# Patient Record
(demographics unavailable — no encounter records)

---

## 2024-10-08 NOTE — DISCUSSION/SUMMARY
[May participate in all age-appropriate activities] : [unfilled] May participate in all age-appropriate activities. [Needs SBE Prophylaxis] : [unfilled] does not need bacterial endocarditis prophylaxis [FreeTextEntry1] : Follow-up as needed

## 2024-10-08 NOTE — PHYSICAL EXAM
[General Appearance - Alert] : alert [General Appearance - In No Acute Distress] : in no acute distress [General Appearance - Well Nourished] : well nourished [General Appearance - Well Developed] : well developed [General Appearance - Well-Appearing] : well appearing [Appearance Of Head] : the head was normocephalic [Facies] : there were no dysmorphic facial features [Sclera] : the conjunctiva were normal [Outer Ear] : the ears and nose were normal in appearance [Examination Of The Oral Cavity] : mucous membranes were moist and pink [Auscultation Breath Sounds / Voice Sounds] : breath sounds clear to auscultation bilaterally [Normal Chest Appearance] : the chest was normal in appearance [Apical Impulse] : quiet precordium with normal apical impulse [Heart Rate And Rhythm] : normal heart rate and rhythm [Heart Sounds] : normal S1 and S2 [Heart Sounds Gallop] : no gallops [Heart Sounds Pericardial Friction Rub] : no pericardial rub [Edema] : no edema [Arterial Pulses] : normal upper and lower extremity pulses with no pulse delay [Heart Sounds Click] : no clicks [Capillary Refill Test] : normal capillary refill [Systolic] : systolic [II] : a grade 2/6 [LMSB] : LMSB  [Short] : short [Low] : low pitched [Vibratory] : vibratory [Early] : early [[place]] : the murmur was transmitted to the apex [Bowel Sounds] : normal bowel sounds [Abdomen Soft] : soft [Nondistended] : nondistended [Abdomen Tenderness] : non-tender [Nail Clubbing] : no clubbing  or cyanosis of the fingers [Cervical Lymph Nodes Enlarged Anterior] : The anterior cervical nodes were normal [Cervical Lymph Nodes Enlarged Posterior] : The posterior cervical nodes were normal [] : no rash [Skin Lesions] : no lesions [Skin Turgor] : normal turgor [Demonstrated Behavior - Infant Nonreactive To Parents] : interactive [Mood] : mood and affect were appropriate for age [Demonstrated Behavior] : normal behavior

## 2024-10-08 NOTE — CONSULT LETTER
[Today's Date] : [unfilled] [Name] : Name: [unfilled] [] : : ~~ [Dear  ___:] : Dear Dr. [unfilled]: [Consult - Single Provider] : Thank you very much for allowing me to participate in the care of this patient. If you have any questions, please do not hesitate to contact me. [Sincerely,] : Sincerely, [FreeTextEntry4] : Naomi Stover [FreeTextEntry5] : [address] [FreeTextEntry6] : [place], [address] [de-identified] : Akshat Christian MD, FAAP, FACC, FAHA Chief Emeritus, Division of Pediatric Cardiology [facility] Professor, Department of Pediatrics, [facility]

## 2024-10-08 NOTE — CARDIOLOGY SUMMARY
[Today's Date] : [unfilled] [FreeTextEntry1] : Sinus rhythm, rate 131/min, QRS axis +88 degrees, NM 0.09, QRS 0.06, QTc 0.45 seconds; possible septal hypertrophy as evidenced by 4 mm Q waves in leads II to III, aVF, V6 and V7. [FreeTextEntry2] : Summary: 1. {S,D,S\} Situs solitus, D-ventricular looping, normally related great arteries. 2. 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 3. Normal left ventricular size, morphology and systolic function. 4. Normal right ventricular morphology with qualitatively normal size and systolic function. 5. No pericardial effusion.

## 2024-11-15 NOTE — DEVELOPMENTAL MILESTONES
[Normal Development] : Normal Development [None] : none [Pats or smiles at reflection] : pats or smiles at reflection [Begins to turn when name called] : begins to turn when name called [Babbles] : babbles [Rolls over prone to supine] : rolls over prone to supine [Sits briefly without support] : sits briefly without support [Reaches for object and transfers] : reaches for object and transfers [Rakes small object with 4 fingers] : rakes small object with 4 fingers [[place] small object on surface] : bangs small object on surface [Passed] : passed

## 2024-11-15 NOTE — DISCUSSION/SUMMARY
[Normal Growth] : growth [Normal Development] : development [None] : No medical problems [No Elimination Concerns] : elimination [No Feeding Concerns] : feeding [No Skin Concerns] : skin [Normal Sleep Pattern] : sleep [Family Functioning] : family functioning [Nutrition and Feeding] : nutrition and feeding [Infant Development] : infant development [Oral Health] : oral health [Safety] : safety [No Medications] : ~He/She~ is not on any medications [Parent/Guardian] : parent/guardian [] : The components of the vaccine(s) to be administered today are listed in the plan of care. The disease(s) for which the vaccine(s) are intended to prevent and the risks have been discussed with the caretaker.  The risks are also included in the appropriate vaccination information statements which have been provided to the patient's caregiver.  The caregiver has given consent to vaccinate. [FreeTextEntry1] : Recommend breastfeeding, 8-12 feedings per day. If formula is needed, 2-4 oz every 3-4 hrs. Introduce single-ingredient foods rich in iron, one at a time. Incorporate up to 4 oz of fluorinated water daily in a sippy cup. When teeth erupt wipe daily with washcloth. When in car, patient should be in rear-facing car seat in back seat. Put baby to sleep on back, in own crib with no loose or soft bedding. Lower crib mattress. Help baby to maintain sleep and feeding routines. May offer pacifier if needed. Continue tummy time when awake. Ensure home is safe since baby is now more mobile. Do not use infant walker. Read aloud to baby. Next PE at 9 months of age\ Discussed FLu vaccine, RSV MAB- will return next week for both

## 2024-11-15 NOTE — HISTORY OF PRESENT ILLNESS
[Well-balanced] : well-balanced [Normal] : Normal [No] : No cigarette smoke exposure [Water heater temperature set at <120 degrees F] : Water heater temperature set at <120 degrees F [Rear facing car seat in back seat] : Rear facing car seat in back seat [Carbon Monoxide Detectors] : Carbon monoxide detectors at home [Smoke Detectors] : Smoke detectors at home. [Mother] : mother [Father] : father [Breast milk] : breast milk [Formula ___ oz/feed] : [unfilled] oz of formula per feed [Fruits] : fruits [Vegetables] : vegetables [Cereal] : cereal [In Bassinet/Crib] : sleeps in bassinet/crib [On back] : sleeps on back [Co-sleeping] : no co-sleeping [Loose bedding, pillow, toys, and/or bumpers in crib] : no loose bedding, pillow, toys, and/or bumpers in crib [Tummy time] : tummy time

## 2024-11-15 NOTE — PHYSICAL EXAM
[Alert] : alert [Normocephalic] : normocephalic [Flat Open Anterior [place]] : flat open anterior fontanelle [Red Reflex] : red reflex bilateral [PERRL] : PERRL [Normally Placed Ears] : normally placed ears [Auricles Well Formed] : auricles well formed [Clear Tympanic membranes] : clear tympanic membranes [Light reflex present] : light reflex present [Bony landmarks visible] : bony landmarks visible [Nares Patent] : nares patent [Palate Intact] : palate intact [Uvula Midline] : uvula midline [Supple, full passive range of motion] : supple, full passive range of motion [Symmetric Chest Rise] : symmetric chest rise [Clear to Auscultation Bilaterally] : clear to auscultation bilaterally [Regular Rate and Rhythm] : regular rate and rhythm [S1, S2 present] : S1, S2 present [+2 Femoral Pulses] : (+) 2 femoral pulses [Soft] : soft [Bowel Sounds] : bowel sounds present [Normal External Genitalia] : normal external genitalia [Normal Vaginal Introitus] : normal vaginal introitus [Patent] : patent [Normally Placed] : normally placed [No Abnormal Lymph Nodes Palpated] : no abnormal lymph nodes palpated [Symmetric Buttocks Creases] : symmetric buttocks creases [Plantar Grasp] : plantar grasp reflex present [Cranial Nerves Grossly Intact] : cranial nerves grossly intact [Acute Distress] : no acute distress [Discharge] : no discharge [Tooth Eruption] : no tooth eruption [Palpable Masses] : no palpable masses [Murmurs] : no murmurs [Tender] : nontender [Distended] : nondistended [Hepatomegaly] : no hepatomegaly [Splenomegaly] : no splenomegaly [Clitoromegaly] : no clitoromegaly [Colon-Ortolani] : negative Colon-Ortolani [Allis Sign] : negative Allis sign [Spinal Dimple] : no spinal dimple [Tuft of Hair] : no tuft of hair [Rash or Lesions] : no rash/lesions

## 2025-01-10 NOTE — DISCUSSION/SUMMARY
[FreeTextEntry1] : 8 month old w/ likely tinea -start clotrimazole-betamethasone cream BID. if requires longer than 2 weeks of treatment advised to call and will switch to clotrimazole - If new or worsening symptoms or parental concern - return to office or ED.

## 2025-01-28 NOTE — DISCUSSION/SUMMARY
[FreeTextEntry1] : URI - Symptomatic therapy as needed including acetaminophen or ibuprofen for fever. Increase fluids Avoid airway irritants Discussed use/avoidance of cold symptom medications Call if no better 3-5 days, sooner for change/concerns/wheeze/distress recheck prn RVP sent ( phone f/u with results)  Tinea corporis- almost resolved continue antifungal x 1 week  f/uprn

## 2025-01-28 NOTE — HISTORY OF PRESENT ILLNESS
[de-identified] : TEMP THIS MORNING 100F--ALSO HAD TEMP LAST WEEK--COUGH X 1 DAY [FreeTextEntry6] : c/o low grade fever this morning, reduced with tylenol cough, congestion began today normal appetite, UOP and BMs

## 2025-02-07 NOTE — DEVELOPMENTAL MILESTONES
[Normal Development] : Normal Development [None] : none [Uses basic gestures] : uses basic gestures [Says "Andrei" or "Mama"] : says "Andrei" or "Mama" nonspecifically [Sits well without support] : sits well without support [Transitions between sitting and lying] : transitions between sitting and lying [Balances on hands and knees] : balances on hands and knees [Crawls] : crawls [Picks up small objects with 3 fingers] : picks up small objects with 3 fingers and thumb [Releases objects intentionally] : releases objects intentionally [[place] objects together] : bangs objects together

## 2025-02-07 NOTE — PHYSICAL EXAM
[Alert] : alert [Acute Distress] : no acute distress [Normocephalic] : normocephalic [Flat Open Anterior [place]] : flat open anterior fontanelle [Red Reflex] : red reflex bilateral [Excessive Tearing] : no excessive tearing [PERRL] : PERRL [Normally Placed Ears] : normally placed ears [Auricles Well Formed] : auricles well formed [Clear Tympanic membranes] : clear tympanic membranes [Light reflex present] : light reflex present [Bony landmarks visible] : bony landmarks visible [Discharge] : no discharge [Nares Patent] : nares patent [Palate Intact] : palate intact [Uvula Midline] : uvula midline [Supple, full passive range of motion] : supple, full passive range of motion [Palpable Masses] : no palpable masses [Symmetric Chest Rise] : symmetric chest rise [Clear to Auscultation Bilaterally] : clear to auscultation bilaterally [Regular Rate and Rhythm] : regular rate and rhythm [S1, S2 present] : S1, S2 present [Murmurs] : no murmurs [+2 Femoral Pulses] : (+) 2 femoral pulses [Soft] : soft [Tender] : nontender [Distended] : nondistended [Bowel Sounds] : bowel sounds present [Hepatomegaly] : no hepatomegaly [Splenomegaly] : no splenomegaly [Normal External Genitalia] : normal external genitalia [Clitoromegaly] : no clitoromegaly [Normal Vaginal Introitus] : normal vaginal introitus [No Abnormal Lymph Nodes Palpated] : no abnormal lymph nodes palpated [Symmetric abduction and rotation of hips] : symmetric abduction and rotation of hips [Allis Sign] : negative Allis sign [Straight] : straight [Cranial Nerves Grossly Intact] : cranial nerves grossly intact [Rash or Lesions] : no rash/lesions [de-identified] : left foot adducted

## 2025-02-07 NOTE — HISTORY OF PRESENT ILLNESS
[Mother] : mother [Father] : father [Well-balanced] : well-balanced [Formula ___ oz/feed] : [unfilled] oz of formula per feed [Fruit] : fruit [Vegetables] : vegetables [Cereal] : cereal [Meat] : meat [Eggs] : eggs [Fish] : fish [Baby food] : baby food [Water] : water [Normal] : Normal [In Crib] : sleeps in crib [On back] : sleeps on back [Co-sleeping] : no co-sleeping [Wakes up at night] : does not wake up at night [Loose bedding, pillow, toys, and/or bumpers in crib] : no loose bedding, pillow, toys, and/or bumpers in crib [None] : Primary Fluoride Source: None [No] : No cigarette smoke exposure [Water heater temperature set at <120 degrees F] : Water heater temperature set at <120 degrees F [Rear facing car seat in  back seat] : Rear facing car seat in  back seat [Carbon Monoxide Detectors] : Carbon monoxide detectors [Smoke Detectors] : Smoke detectors

## 2025-02-07 NOTE — DISCUSSION/SUMMARY

## 2025-02-27 NOTE — HISTORY OF PRESENT ILLNESS
[0] : currently ~his/her~ pain is 0 out of 10 [FreeTextEntry1] : 9 month old female presents with her parents for evaluation of her legs. Mother states the pediatrician was concerned when laying down the legs turn outward. Mother states when she stands her feet turn outward.  She is currently pully to stand since 7 months of age. She was born at 39 weeks gestation via vaginal delivery. She was 7.1lbs. She stayed in NICU to monitor breathing for 1.5 days.

## 2025-02-27 NOTE — REVIEW OF SYSTEMS
[Appropriate Age Development] : development appropriate for age [Change in Activity] : no change in activity [Rash] : no rash [Heart Problems] : no heart problems [Feeding Problem] : no feeding problem [Joint Pains] : no arthralgias

## 2025-02-27 NOTE — ASSESSMENT
[FreeTextEntry1] : Planovalgus feet Ligamentous laxity  The history for today's visit was obtained from the  parent due to age and therefore, the parent was used today as an independent historian. The above was discussed at length with parents. The external rotation of the legs at rest is most likely due to proximal weakness and appropriate for age. As strength improves the foot position will improve and change.  The planovalgus is due to flexibility and not concerning at this time. Observation is recommended. No orthotics or other treatment needed.  She will f/u on a PRN basis.  All questions answered. Parent in agreement with the plan. INiyah, CONCHA, PAC, have acted as a scribe and documented the above for Dr. Retana.  The above documentation completed by the scribe is an accurate record of both my words and actions.  NIRMAL

## 2025-02-27 NOTE — BIRTH HISTORY
[Duration: ___ wks] : duration: [unfilled] weeks [Vaginal] : Vaginal [___ lbs.] : [unfilled] lbs [___ oz.] : [unfilled] oz. [Was child in NICU?] : Child was in NICU [FreeTextEntry7] : 1.5 days breathing.

## 2025-02-27 NOTE — DEVELOPMENTAL MILESTONES
[Roll Over: ___ Months] : Roll Over: [unfilled] months [Sit Up: ___ Months] : Sit Up: [unfilled] months [Pull Self to Stand ___ Months] : Pull self to stand: [unfilled] months [Not Yet Determined] : not yet determined

## 2025-02-27 NOTE — PHYSICAL EXAM
[FreeTextEntry1] : Head: no evidence of plagiocephaly neck: full symmetrical ROM, no cords palpable. Nontender clavicles upper extremity: full symmetrical passive ROM all joints without instability spine: no dimples or hairy patches, no evidence of scoliosis or excessive kyphosis. Lower extremity: no LLD noted. equal girth.  hips: stable,wide symmetrical abduction. , neg galleazzi, IR 60 degrees bilaterally.  Neg asymmetry of thigh folds lower extremity: full ROM knees/ankles and feet. tibia vara noted bilaterally symmetrical No instability to stress of knees No clicking noted. ankle DF +60 with knees extended. Some hypermobility noted ankles.  foot: no evidence of MA.  upon standing, planovalgus ankles noted.

## 2025-03-15 NOTE — HISTORY OF PRESENT ILLNESS
[de-identified] : STUFFY NOSE, CONGESTION, BLOOD IN STOOL FOR 3 DAYS [FreeTextEntry6] : had 2 hard stools then diarrhea trace blood noted in loose stool x 1 No fever

## 2025-03-15 NOTE — DISCUSSION/SUMMARY
[FreeTextEntry1] : apply ointment to fissure keep stools soft- reinforced diet NS nasal spray Cool Mist HS

## 2025-05-16 NOTE — HISTORY OF PRESENT ILLNESS
[Mother] : mother [Formula ___ oz/feed] : [unfilled] oz of formula per feed [Table food] : table food [Normal] : Normal [None] : Primary Fluoride Source: None [No] : No cigarette smoke exposure [Water heater temperature set at <120 degrees F] : Water heater temperature set at <120 degrees F [Car seat in back seat] : Car seat in back seat [Smoke Detectors] : Smoke detectors [Carbon Monoxide Detectors] : Carbon monoxide detectors [At risk for exposure to TB] : Not at risk for exposure to Tuberculosis [de-identified] : maternal grandparents  [FreeTextEntry7] : doing well on miralax

## 2025-05-16 NOTE — PHYSICAL EXAM
[Alert] : alert [Normocephalic] : normocephalic [Closed Anterior [place]] : closed anterior fontanelle [Red Reflex] : red reflex bilateral [PERRL] : PERRL [Normally Placed Ears] : normally placed ears [Auricles Well Formed] : auricles well formed [Clear Tympanic membranes] : clear tympanic membranes [Light reflex present] : light reflex present [Bony landmarks visible] : bony landmarks visible [Nares Patent] : nares patent [Palate Intact] : palate intact [Uvula Midline] : uvula midline [Tooth Eruption] : tooth eruption [Supple, full passive range of motion] : supple, full passive range of motion [Symmetric Chest Rise] : symmetric chest rise [Clear to Auscultation Bilaterally] : clear to auscultation bilaterally [Regular Rate and Rhythm] : regular rate and rhythm [S1, S2 present] : S1, S2 present [Murmurs] : murmurs [+2 Femoral Pulses] : (+) 2 femoral pulses [Soft] : soft [Bowel Sounds] : normoactive bowel sounds [Normal External Genitalia] : normal external genitalia [Normal Vaginal Introitus] : normal vaginal introitus [No Abnormal Lymph Nodes Palpated] : no abnormal lymph nodes palpated [Symmetric Abduction and Rotation of Hips] : symmetric abduction and rotation of hips [Straight] : straight [Cranial Nerves Grossly Intact] : cranial nerves grossly intact [Discharge] : no discharge [Palpable Masses] : no palpable masses [Tender] : nontender [Distended] : nondistended [Hepatomegaly] : no hepatomegaly [Splenomegaly] : no splenomegaly [Clitoromegaly] : no clitoromegaly [Allis Sign] : negative Allis sign [Rash or Lesions] : no rash/lesions

## 2025-07-16 NOTE — DISCUSSION/SUMMARY
[FreeTextEntry1] : NO FOCUS LIKELY ROSEOLA INST GIVEN R/O UTI PLCED URINE BAG INST GIVEN DO U/A C S WILL CALL ONCE RESULTS IN